# Patient Record
Sex: FEMALE | URBAN - METROPOLITAN AREA
[De-identification: names, ages, dates, MRNs, and addresses within clinical notes are randomized per-mention and may not be internally consistent; named-entity substitution may affect disease eponyms.]

---

## 2022-06-03 ENCOUNTER — TELEPHONE (OUTPATIENT)
Dept: FAMILY MEDICINE CLINIC | Facility: CLINIC | Age: 72
End: 2022-06-03

## 2022-06-03 NOTE — TELEPHONE ENCOUNTER
Spoke to pt advised on recommendations to push fluids, take tyleonol and/or ibuprofen for pain relief, monitor pulse ox and if drops below 90% consistently to go to ED for evaluation. Pt understands and is agreeable. Pt asking if Dr. Chepe Moreira will still see her as a patient, stating she has been a long time pt of his but family matters prevented her from coming in.

## 2022-06-03 NOTE — TELEPHONE ENCOUNTER
I am sorry that Mariah Gutiérrez has [place] now. Please follow the recommendations that I gave for Tali. I recommend drinking lots of fluids, taking ibuprofen and/or acetaminophen if needed for pain or discomfort. I recommend having a home oximeter to check the oxygen levels. If the oxygen levels drop below 90% then please go up to the emergency department. Please give us a call early next week with a follow-up visit.

## 2022-06-03 NOTE — TELEPHONE ENCOUNTER
Pt calling and states she has been patient of Dr. Lacretia Holstein for years but has not seen him in at least 10 years. She now has Covid and wants to know what she should be doing. Advised Dr. Seferino Scales is not accepting new patients. She asked that he advise on what she should do about her illness. Please advise.

## 2022-06-03 NOTE — TELEPHONE ENCOUNTER
Unfortunately, I am unable to accept her as a patient now. After my heart attack in October I have cut back on office hours dramatically. At this time I am unable to see all of my regular patients and unable to take on any new patients at all.

## 2022-06-06 NOTE — TELEPHONE ENCOUNTER
Patient informed of below. Expressed understanding.   Gonzales Garner [facility], 06/06/22, 12:11 PM

## 2022-08-22 ENCOUNTER — TELEPHONE (OUTPATIENT)
Dept: FAMILY MEDICINE CLINIC | Facility: CLINIC | Age: 72
End: 2022-08-22

## 2022-08-22 NOTE — TELEPHONE ENCOUNTER
Patient submitted a signed authorization to release medical records to herself to take to new pcp's office. Pt has not found new pcp at this time. This was sent to [facility].